# Patient Record
Sex: MALE | Race: WHITE | NOT HISPANIC OR LATINO | Employment: FULL TIME | URBAN - NONMETROPOLITAN AREA
[De-identification: names, ages, dates, MRNs, and addresses within clinical notes are randomized per-mention and may not be internally consistent; named-entity substitution may affect disease eponyms.]

---

## 2020-02-29 ENCOUNTER — OFFICE VISIT (OUTPATIENT)
Dept: FAMILY MEDICINE | Facility: OTHER | Age: 35
End: 2020-02-29
Attending: PHYSICIAN ASSISTANT
Payer: COMMERCIAL

## 2020-02-29 VITALS
BODY MASS INDEX: 29.86 KG/M2 | HEART RATE: 70 BPM | RESPIRATION RATE: 16 BRPM | OXYGEN SATURATION: 97 % | WEIGHT: 197 LBS | TEMPERATURE: 96.7 F | HEIGHT: 68 IN | DIASTOLIC BLOOD PRESSURE: 84 MMHG | SYSTOLIC BLOOD PRESSURE: 129 MMHG

## 2020-02-29 DIAGNOSIS — H66.001 NON-RECURRENT ACUTE SUPPURATIVE OTITIS MEDIA OF RIGHT EAR WITHOUT SPONTANEOUS RUPTURE OF TYMPANIC MEMBRANE: ICD-10-CM

## 2020-02-29 DIAGNOSIS — H60.391 INFECTIVE OTITIS EXTERNA, RIGHT: Primary | ICD-10-CM

## 2020-02-29 PROCEDURE — 99203 OFFICE O/P NEW LOW 30 MIN: CPT | Performed by: PHYSICIAN ASSISTANT

## 2020-02-29 RX ORDER — AMOXICILLIN 875 MG
875 TABLET ORAL 2 TIMES DAILY
Qty: 14 TABLET | Refills: 0 | Status: SHIPPED | OUTPATIENT
Start: 2020-02-29 | End: 2023-04-15

## 2020-02-29 RX ORDER — CIPROFLOXACIN AND DEXAMETHASONE 3; 1 MG/ML; MG/ML
4 SUSPENSION/ DROPS AURICULAR (OTIC) 2 TIMES DAILY
Qty: 2.8 ML | Refills: 0 | Status: SHIPPED | OUTPATIENT
Start: 2020-02-29 | End: 2020-03-07

## 2020-02-29 ASSESSMENT — MIFFLIN-ST. JEOR: SCORE: 1809.84

## 2020-02-29 ASSESSMENT — PAIN SCALES - GENERAL: PAINLEVEL: MODERATE PAIN (4)

## 2020-02-29 NOTE — PROGRESS NOTES
"SUBJECTIVE:  Lukas Uribe is a 34 year old male presents to clinic for evaluation of right ear pain  Onset 9 days ago, course got worse last night  He first noted symptoms on his flight from [facility] to [facility] 9 days ago, he was wearing ear buds on the way home, he wasn't sure if the pain was from this or the pressure from flying.   Ear pain became severe last evening. He also has mild congestion which started today.     OM history:  Infrequent infections, none that he can recall as adult  Treatments: ibuprofen daily, last dose this AM  Eating and drinking - normal      No past medical history on file.  No current outpatient medications on file.     No current facility-administered medications for this visit.       No Known Allergies    ROS  General feels well, no fever  HENT: right ear pain, mild congestion  Respiratory negative  Abdomen - negative  Skin - negative    OBJECTIVE:  Vitals:    02/29/20 1201   BP: 129/84   Pulse: 70   Resp: 16   Temp: 96.7  F (35.9  C)   TempSrc: Tympanic   SpO2: 97%   Weight: 89.4 kg (197 lb)   Height: 1.73 m (5' 8.11\")     General appearance: healthy, alert and NAD.    Eye: no injection or drainage  Ears: abnormal: R TM erythematous and bulging; canal has debris and is TTP L TM normal  Nose: mucosal erythema and mucosal edema, no sinus tenderness  Oropharynx: Mild erythema, no exudates or petechiae  Neck: normal, supple and no adenopathy  Cardiac: normal RR, no murmur  Lungs: normal respiration, clear to ausculation  Skin: no rash    ASSESSMENT:  (H60.391) Infective otitis externa, right  (primary encounter diagnosis)  Plan: ciprofloxacin-dexamethasone (CIPRODEX) 0.3-0.1         % otic suspension      (H66.001) Non-recurrent acute suppurative otitis media of right ear without spontaneous rupture of tympanic membrane  Plan: amoxicillin (AMOXIL) 875 MG tablet    Right ear pain x 9 days with worsening  External ear canal infection and middle ear infection  Start " amoxicillin 875mg oral tablet, take twice daily for 5-7  For external ear canal infection - ciprodex 4 drops twice daily x 7 days  Water precautions, do not submerge head in pool or tub until symptoms are resolved and medication is completed.   Rest and fluids  Ibuprofen or tylenol as needed for discomfort or fever  For sinus congestion - sudafed, Afrin nasal spray  Return to clinic if symptoms persist or worsen  Patient received verbal and written instruction including review of warning signs    Jeana Trammell PA-C on 2/29/2020 at 12:54 PM

## 2020-02-29 NOTE — PATIENT INSTRUCTIONS
Right ear pain  External ear canal infection and middle ear infection  Start amoxicillin 875mg oral tablet, take twice daily for 5-7  For external ear canal infection - ciprodex 4 drops twice daily x 7 days  Water precautions, do not submerge head in pool or tub until symptoms are resolved and medication is completed.   Rest and fluids  Ibuprofen or tylenol as needed for discomfort or fever  For sinus congestion - sudafed, Afrin nasal spray  Return to clinic if symptoms persist or worsen        Patient Education     Middle Ear Infection (Adult)  You have an infection of the middle ear, the space behind the eardrum. This is also called acute otitis media (AOM). Sometimes it is caused by the common cold. This is because congestion can block the internal passage (eustachian tube) that drains fluid from the middle ear. When the middle ear fills with fluid, bacteria can grow there and cause an infection. Oral antibiotics are used to treat this illness, not ear drops. Symptoms usually start to improve within 1 to 2 days of treatment.    Home care  The following are general care guidelines:    Finish all of the antibiotic medicine given, even though you may feel better after the first few days.    You may use over-the-counter medicine, such as acetaminophen or ibuprofen, to control pain and fever, unless something else was prescribed. If you have chronic liver or kidney disease or have ever had a stomach ulcer or gastrointestinal bleeding, talk with your healthcare provider before using these medicines. Do not give aspirin to anyone under 18 years of age who has a fever. It may cause severe illness or death.  Follow-up care  Follow up with your healthcare provider, or as advised, in 2 weeks if all symptoms have not gotten better, or if hearing doesn't go back to normal within 1 month.  When to seek medical advice  Call your healthcare provider right away if any of these occur:    Ear pain gets worse or does not improve  after 3 days of treatment    Unusual drowsiness or confusion    Neck pain, stiff neck, or headache    Fluid or blood draining from the ear canal    Fever of 100.4 F (38 C) or as advised     Seizure  Date Last Reviewed: 6/1/2016 2000-2019 [employer]. [address] [place], [place] [address]. All rights reserved. This information is not intended as a substitute for professional medical care. Always follow your healthcare professional's instructions.

## 2020-02-29 NOTE — NURSING NOTE
"Chief Complaint   Patient presents with     Otalgia     x 1 week       Initial /84   Pulse 70   Temp 96.7  F (35.9  C) (Tympanic)   Resp 16   Ht 1.73 m (5' 8.11\")   Wt 89.4 kg (197 lb)   SpO2 97%   BMI 29.86 kg/m   Estimated body mass index is 29.86 kg/m  as calculated from the following:    Height as of this encounter: 1.73 m (5' 8.11\").    Weight as of this encounter: 89.4 kg (197 lb).  Medication Reconciliation: complete    Nel Mason LPN  "

## 2020-09-23 ENCOUNTER — HOSPITAL ENCOUNTER (OUTPATIENT)
Dept: MRI IMAGING | Facility: OTHER | Age: 35
Discharge: HOME | End: 2020-09-23
Attending: CHIROPRACTOR | Admitting: FAMILY MEDICINE
Payer: COMMERCIAL

## 2020-09-23 ENCOUNTER — MEDICAL CORRESPONDENCE (OUTPATIENT)
Dept: HEALTH INFORMATION MANAGEMENT | Facility: OTHER | Age: 35
End: 2020-09-23

## 2020-09-23 DIAGNOSIS — M99.01 CERVICAL SEGMENT DYSFUNCTION: ICD-10-CM

## 2020-09-23 PROCEDURE — 72141 MRI NECK SPINE W/O DYE: CPT | Mod: TC

## 2023-04-15 ENCOUNTER — OFFICE VISIT (OUTPATIENT)
Dept: FAMILY MEDICINE | Facility: OTHER | Age: 38
End: 2023-04-15
Payer: COMMERCIAL

## 2023-04-15 VITALS
HEIGHT: 66 IN | TEMPERATURE: 97.4 F | BODY MASS INDEX: 33.27 KG/M2 | DIASTOLIC BLOOD PRESSURE: 86 MMHG | SYSTOLIC BLOOD PRESSURE: 136 MMHG | RESPIRATION RATE: 16 BRPM | HEART RATE: 78 BPM | WEIGHT: 207 LBS | OXYGEN SATURATION: 96 %

## 2023-04-15 DIAGNOSIS — H66.004 RECURRENT ACUTE SUPPURATIVE OTITIS MEDIA OF RIGHT EAR WITHOUT SPONTANEOUS RUPTURE OF TYMPANIC MEMBRANE: ICD-10-CM

## 2023-04-15 PROCEDURE — 99213 OFFICE O/P EST LOW 20 MIN: CPT | Performed by: NURSE PRACTITIONER

## 2023-04-15 RX ORDER — AMOXICILLIN 875 MG
875 TABLET ORAL 2 TIMES DAILY
Qty: 14 TABLET | Refills: 0 | Status: SHIPPED | OUTPATIENT
Start: 2023-04-15 | End: 2023-06-08

## 2023-04-15 RX ORDER — CIPROFLOXACIN AND DEXAMETHASONE 3; 1 MG/ML; MG/ML
4 SUSPENSION/ DROPS AURICULAR (OTIC)
COMMUNITY
Start: 2023-03-29 | End: 2023-06-08

## 2023-04-15 RX ORDER — LORATADINE 10 MG/1
10 TABLET ORAL DAILY
COMMUNITY

## 2023-04-15 ASSESSMENT — PAIN SCALES - GENERAL: PAINLEVEL: MILD PAIN (2)

## 2023-04-15 NOTE — PROGRESS NOTES
"HPI:    Lukas Uribe is a very pleasant 38 year old male who presents to [facility] today accompanied by his significant other/wife for right ear pain. This has been going on for 2.5 weeks, ongoing. He was given Ciprodex and has used as directed, in addition to Claritin with no improvement.  Reports pain 2/10 and 8/10 at its worse.  He has a history of chronic ear infections as a child and as an adult.  He shows me a picture of some yellow pus-like drainage that drained from his right ear this past week.    ROS:  Refer to HPI    /86 (BP Location: Right arm, Patient Position: Sitting, Cuff Size: Adult Regular)   Pulse 78   Temp 97.4  F (36.3  C) (Tympanic)   Resp 16   Ht 1.676 m (5' 6\")   Wt 93.9 kg (207 lb)   SpO2 96%   BMI 33.41 kg/m      EXAM:  General Appearance: Well appearing male, appropriate appearance for age. No acute distress    Ears:   Left TM intact, translucent with bony landmarks appreciated, mild erythema, no effusion, no bulging, no purulence.    Unable to visualize the right TM due to swelling, he does have some soft pale yellow wax or pus just before the TM.  Eyes: conjunctivae normal without erythema or irritation, corneas clear, no drainage or crusting, no eyelid swelling, pupils equal   Neck: supple without adenopathy  Musculoskeletal:  Equal movement of bilateral upper extremities.  Equal movement of bilateral lower extremities.  Normal gait.    Dermatological: no rashes noted of exposed skin  Psychological: normal affect, alert, oriented, and pleasant.     Diagnostics:  No results found for any visits on 04/15/23.    ASSESSMENT/PLAN:  Lukas was seen today for ear problem and fever.    Diagnoses and all orders for this visit:    Recurrent acute suppurative otitis media of right ear without spontaneous rupture of tympanic membrane  I question whether he has had a rupture of the TM on the right side due to the puslike drainage he shows me a picture of.  We are to go ahead and " treat him with amoxicillin as this is worked for him in the past.  He can go ahead and use the Ciprodex drops only if he is having pain greater than 8 out of 10.  We had a long discussion about the potential of following up with ENT should these become ongoing in the near future.  He is advised to take an over-the-counter antihistamine as he has been doing.  Drink plenty of fluids.  He is advised to take a probiotic and/or eat yogurt to prevent GI upset from the antibiotics.  He is advised if he is not feeling remarkably better by Wednesday of next week, he is to contact the clinic unit 3 and be put on my schedule for further evaluation and referral to ENT.  -     amoxicillin (AMOXIL) 875 MG tablet; Take 1 tablet (875 mg) by mouth 2 times daily    Encouraged symptomatic treatment - May use over-the-counter Tylenol or ibuprofen PRN for discomfort, swelling, or fever.  May use OTC antihistamine such as Zyrtec, Allegra, or Claritin or generic alternatives.    Encouraged fluids, salt water gargles, Chloraseptic throat spray, honey, humidifier, sinus rinse/Netti Pot, hot tea, lozenges, etc.    Discussed warning signs/symptoms indicative of need to follow-up.    Follow up with PCP or ED if symptoms persist or worsen or concerns.    I explained my diagnostic considerations and recommendations to the patient, who voiced understanding and agreement with the treatment plan. All questions were answered. We discussed potential side effects of any prescribed or recommended therapies, as well as expectations for response to treatments.    TREASURE Beltre, AGNP-C  [facility]  04/15/2023 11:19 AM

## 2023-04-15 NOTE — NURSING NOTE
Chief Complaint   Patient presents with     Ear Problem     X 2.5 WEEKS     Fever     X 2 days     Patient in clinic after 2 weeks of tx with cipro/dexameth for ear pain without relief.    Advanced Care Planning on file? no    Medication Review Completed: complete    FOOD SECURITY SCREENING QUESTIONS:    The next two questions are to help us understand your food security.  If you are feeling you need any assistance in this area, we have resources available to support you today.    Hunger Vital Signs:  Within the past 12 months we worried whether our food would run out before we got money to buy more.never  Within the past 12 months the food we bought just didn't last and we didn't have money to get more. Never    Haydee Membreno LPN

## 2023-04-19 ENCOUNTER — TELEPHONE (OUTPATIENT)
Dept: INTERNAL MEDICINE | Facility: OTHER | Age: 38
End: 2023-04-19
Payer: COMMERCIAL

## 2023-04-19 NOTE — TELEPHONE ENCOUNTER
Patient states he saw you in the [facility] on Sat and was told to call if he did not see improvement.  States that his ear still feels plugged, fluid on ear and green mucus coming out of ear.  He is wondering if you have any recommendations  Kirsty Leach LPN.......4/19/2023 4:22 PM

## 2023-04-20 NOTE — TELEPHONE ENCOUNTER
Message below read back to patient, no further questions  Kirsty Leach LPN.......4/20/2023 10:37 AM

## 2023-04-24 ENCOUNTER — OFFICE VISIT (OUTPATIENT)
Dept: FAMILY MEDICINE | Facility: OTHER | Age: 38
End: 2023-04-24
Payer: COMMERCIAL

## 2023-04-24 VITALS
WEIGHT: 216 LBS | DIASTOLIC BLOOD PRESSURE: 86 MMHG | OXYGEN SATURATION: 96 % | HEART RATE: 71 BPM | SYSTOLIC BLOOD PRESSURE: 138 MMHG | RESPIRATION RATE: 16 BRPM | BODY MASS INDEX: 34.86 KG/M2 | TEMPERATURE: 97.8 F

## 2023-04-24 DIAGNOSIS — H66.014 RECURRENT ACUTE SUPPURATIVE OTITIS MEDIA OF RIGHT EAR WITH SPONTANEOUS RUPTURE OF TYMPANIC MEMBRANE: Primary | ICD-10-CM

## 2023-04-24 PROCEDURE — 99203 OFFICE O/P NEW LOW 30 MIN: CPT

## 2023-04-24 RX ORDER — OFLOXACIN 3 MG/ML
10 SOLUTION AURICULAR (OTIC) 2 TIMES DAILY
Qty: 7 ML | Refills: 0 | Status: SHIPPED | OUTPATIENT
Start: 2023-04-24 | End: 2023-05-02

## 2023-04-24 ASSESSMENT — PAIN SCALES - GENERAL: PAINLEVEL: MILD PAIN (2)

## 2023-04-24 NOTE — PROGRESS NOTES
ASSESSMENT/PLAN:    I have reviewed the nursing notes.  I have reviewed the findings, diagnosis, plan and need for follow up with the patient.    1. Recurrent acute suppurative otitis media of right ear with spontaneous rupture of tympanic membrane  - amoxicillin-clavulanate (AUGMENTIN) 875-125 MG tablet; Take 1 tablet by mouth 2 times daily for 7 days  Dispense: 14 tablet; Refill: 0  - ofloxacin (FLOXIN) 0.3 % otic solution; Place 10 drops into the right ear 2 times daily for 14 days  Dispense: 7 mL; Refill: 0    Physical exam and symptoms consistent with recurrent acute suppurative otitis media of the right ear with spontaneous rupture of his tympanic membrane.  There is a moderate amount of purulent drainage in his right ear.  Patient has been flushing his ear over the last week due to noticing some purulent drainage.  Will treat infection with Augmentin twice a day for 7 days and ofloxacin otic solution.  Advised patient to take the Augmentin with food to reduce the risk of GI upset.  Advised patient not to flush his ears as this is can reintroduce bacteria into his ear canal.  Advised patient that when he showers or bathes that he should put cotton in the outer ear canal to prevent water from getting into his ear canal.  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  Advised patient that he can take Tylenol or ibuprofen as needed for pain.  Advised patient that he can follow-up after completing treatment to check the status of the healing of his tympanic membrane and determine if a referral to ENT is needed.    Discussed warning signs/symptoms indicative of need to f/u    Follow up if symptoms persist or worsen or concerns    I explained my diagnostic considerations and recommendations to the patient, who voiced understanding and agreement with the treatment plan. All questions were answered. We  discussed potential side effects of any prescribed or recommended therapies, as well as expectations for response to treatments.    TREASURE Mao CNP  4/24/2023  11:04 AM    HPI:    Lukas Uribe is a 38 year old male  who presents to [facility] today for concerns of ear pain    right ear pain x 3 weeks duration.     Presence of the following:   No fevers or chills.   No sore throat/pharyngitis/tonsillitis.   Yes allergy/URI Symptoms  Yes Muffled Sounds/Change in Hearing  Yes Sensation of Fullness in Ear(s)  No Ringing in Ears/Tinnitus  No Balance Changes  No Dizziness  No Headache   No Ear Drainage  No Teething  Additional Symptoms: No  Denies persistent hearing loss, foul smelling odor from ear, changes in vision, nausea, vomiting, diarrhea, chest pain, shortness of breath.     No Recent swimming/hot tub  No submerging of head in shower/bathtub.     No Recent URI or other illness  History of otitis media: Yes  History of HEENT surgery (PE tubes, tonsillectomy/adenoidectomy, etc.): No  Recent Course of ABX: Yes: amoxicillin for otitis media on 4/15/23    Treatments Tried: steroid drops, tylenol, ibuprofen  Prior History of Similar Symptoms: Yes    PCP - none    History reviewed. No pertinent past medical history.  History reviewed. No pertinent surgical history.  Social History     Tobacco Use     Smoking status: Every Day     Types: Vaping Device     Smokeless tobacco: Never   Vaping Use     Vaping status: Every Day     Substances: Nicotine, Flavoring   Substance Use Topics     Alcohol use: Yes     Current Outpatient Medications   Medication Sig Dispense Refill     ciprofloxacin-dexamethasone (CIPRODEX) 0.3-0.1 % otic suspension Place 4 drops in ear(s)       loratadine (CLARITIN) 10 MG tablet Take 10 mg by mouth daily       amoxicillin (AMOXIL) 875 MG tablet Take 1 tablet (875 mg) by mouth 2 times daily (Patient not taking: Reported on 4/24/2023) 14 tablet 0     No Known Allergies  Past medical  history, past surgical history, current medications and allergies reviewed and accurate to the best of my knowledge.      ROS:  Refer to HPI    /86   Pulse 71   Temp 97.8  F (36.6  C) (Tympanic)   Resp 16   Wt 98 kg (216 lb)   SpO2 96%   BMI 34.86 kg/m      EXAM:  General Appearance: Well appearing 38 year old male, appropriate appearance for age. No acute distress   Ears: Left TM intact, translucent with bony landmarks appreciated, no erythema, no effusion, no bulging, no purulence.  Right TM not intact, mild erythema, effusion, no bulging, purulence noted.  Left auditory canal clear.  Right auditory canal with a moderate amount of drainage.  Normal external ears, non tender.  Eyes: conjunctivae normal without erythema or irritation, corneas clear, no drainage or crusting, no eyelid swelling, pupils equal   Respiratory: normal chest wall and respirations.  Normal effort. No increased work of breathing.  No cough appreciated.  Cardiac: RRR   Neuro: Alert and oriented to person, place, and time.    Psychological: normal affect, alert, oriented, and pleasant.

## 2023-04-24 NOTE — NURSING NOTE
"Chief Complaint   Patient presents with     Ear Problem     Patient presents today with right ear pain. He was seen last week and was given amoxicillin, in which he has completed. He states that it did not get better. He also has been using some drops.   Initial /86   Pulse 71   Temp 97.8  F (36.6  C) (Tympanic)   Resp 16   Wt 98 kg (216 lb)   SpO2 96%   BMI 34.86 kg/m   Estimated body mass index is 34.86 kg/m  as calculated from the following:    Height as of 4/15/23: 1.676 m (5' 6\").    Weight as of this encounter: 98 kg (216 lb).  Medication Reconciliation: complete    Laurence Lafleur LPN     Advanced Care Directive reviewed.     "

## 2023-05-02 ENCOUNTER — OFFICE VISIT (OUTPATIENT)
Dept: FAMILY MEDICINE | Facility: OTHER | Age: 38
End: 2023-05-02
Attending: NURSE PRACTITIONER
Payer: COMMERCIAL

## 2023-05-02 VITALS
SYSTOLIC BLOOD PRESSURE: 130 MMHG | OXYGEN SATURATION: 97 % | WEIGHT: 207.7 LBS | DIASTOLIC BLOOD PRESSURE: 90 MMHG | HEIGHT: 67 IN | TEMPERATURE: 97.3 F | HEART RATE: 82 BPM | BODY MASS INDEX: 32.6 KG/M2 | RESPIRATION RATE: 20 BRPM

## 2023-05-02 DIAGNOSIS — H60.311 ACUTE DIFFUSE OTITIS EXTERNA OF RIGHT EAR: Primary | ICD-10-CM

## 2023-05-02 PROCEDURE — 99213 OFFICE O/P EST LOW 20 MIN: CPT

## 2023-05-02 RX ORDER — OFLOXACIN 3 MG/ML
10 SOLUTION AURICULAR (OTIC) 2 TIMES DAILY
Qty: 7 ML | Refills: 0 | Status: SHIPPED | OUTPATIENT
Start: 2023-05-02 | End: 2023-06-08

## 2023-05-02 ASSESSMENT — PAIN SCALES - GENERAL: PAINLEVEL: MILD PAIN (2)

## 2023-05-02 NOTE — PROGRESS NOTES
ASSESSMENT/PLAN:    I have reviewed the nursing notes.  I have reviewed the findings, diagnosis, plan and need for follow up with the patient.    1. Acute diffuse otitis externa of right ear  - ofloxacin (FLOXIN) 0.3 % otic solution; Place 10 drops into the right ear 2 times daily  Dispense: 7 mL; Refill: 0  - Adult ENT  Referral; Future    Physical exam and symptoms consistent with recurrent otitis externa of his right ear.  Unable to visualize tympanic membrane due to copious amounts of purulent drainage in patient's right ear canal.  Refilled patient's ofloxacin otic solution and advised him to continue with this treatment.  Placed referral to ENT for further evaluation.  Advised patient to take Tylenol and ibuprofen as needed for pain.  Continue use of placing cotton in outer ear canal while working and bathing.    Discussed warning signs/symptoms indicative of need to f/u    Follow up if symptoms persist or worsen or concerns    I explained my diagnostic considerations and recommendations to the patient, who voiced understanding and agreement with the treatment plan. All questions were answered. We discussed potential side effects of any prescribed or recommended therapies, as well as expectations for response to treatments.    Jose Alfredo Browning, APRN CNP  5/2/2023  9:44 AM    HPI:    Lukas Uribe is a 38 year old male  who presents to [facility] today for concerns of ear pain    right ear pain x 1 month duration.     Presence of the following:   Yes fevers or chills. Fever, highest reported temperature: low grade  No sore throat/pharyngitis/tonsillitis.   No allergy/URI Symptoms  Yes Muffled Sounds/Change in Hearing  Yes Sensation of Fullness in Ear(s)  No Ringing in Ears/Tinnitus  No Balance Changes  No Dizziness  No Headache   Yes Ear Drainage  No Teething  Additional Symptoms: No  Denies persistent hearing loss, foul smelling odor from ear, changes in vision, nausea, vomiting, diarrhea, chest  "pain, shortness of breath.     No Recent swimming/hot tub  No submerging of head in shower/bathtub.     No Recent URI or other illness  History of otitis media: Yes:  4/24/23 treated for otitis media with spontaneous rupture of tympanic membrane with Augmentin and ofloxacin otic solution.   4/15/23 treated for otitis media with spontaneous rupture of tympanic membrane with amoxicillin.   3/29/23 treated for right otitis externa with Ciprodex otic suspension  History of HEENT surgery (PE tubes, tonsillectomy/adenoidectomy, etc.): No  Recent Course of ABX: Yes: see above    Treatments Tried: tylenol  Prior History of Similar Symptoms: Yes    PCP - none    History reviewed. No pertinent past medical history.  History reviewed. No pertinent surgical history.  Social History     Tobacco Use     Smoking status: Every Day     Types: Vaping Device     Smokeless tobacco: Never   Vaping Use     Vaping status: Every Day     Substances: Nicotine, Flavoring   Substance Use Topics     Alcohol use: Yes     Current Outpatient Medications   Medication Sig Dispense Refill     loratadine (CLARITIN) 10 MG tablet Take 10 mg by mouth daily       ofloxacin (FLOXIN) 0.3 % otic solution Place 10 drops into the right ear 2 times daily for 14 days 7 mL 0     amoxicillin (AMOXIL) 875 MG tablet Take 1 tablet (875 mg) by mouth 2 times daily (Patient not taking: Reported on 4/24/2023) 14 tablet 0     ciprofloxacin-dexamethasone (CIPRODEX) 0.3-0.1 % otic suspension Place 4 drops in ear(s) (Patient not taking: Reported on 5/2/2023)       No Known Allergies  Past medical history, past surgical history, current medications and allergies reviewed and accurate to the best of my knowledge.      ROS:  Refer to HPI    BP (!) 130/90 (BP Location: Right arm, Patient Position: Sitting, Cuff Size: Adult Large)   Pulse 82   Temp 97.3  F (36.3  C) (Tympanic)   Resp 20   Ht 1.702 m (5' 7\")   Wt 94.2 kg (207 lb 11.2 oz)   SpO2 97%   BMI 32.53 kg/m  "     EXAM:  General Appearance: Well appearing 38 year old male, appropriate appearance for age. No acute distress   Ears: Left TM intact, translucent with bony landmarks appreciated, no erythema, no effusion, no bulging, no purulence.  Right TM unable to visualize due to purulent drainage.  Left auditory canal clear.  Right auditory canal with purulent drainage.  Normal external ears, non tender.  Eyes: conjunctivae normal without erythema or irritation, corneas clear, no drainage or crusting, no eyelid swelling, pupils equal   Oropharynx: moist mucous membranes, posterior pharynx without erythema, tonsils symmetric and 1+, no erythema, no exudates or petechiae, no post nasal drip seen, no trismus, voice clear.    Nose:  Bilateral nares: no erythema, no edema, no drainage or congestion   Neck: supple without adenopathy  Respiratory: normal chest wall and respirations.  Normal effort.  Clear to auscultation bilaterally, no wheezing, crackles or rhonchi.  No increased work of breathing.  No cough appreciated.  Cardiac: RRR with no murmurs  Neuro: Alert and oriented to person, place, and time.    Psychological: normal affect, alert, oriented, and pleasant.

## 2023-05-02 NOTE — NURSING NOTE
"Pt presents to  for R ear pain x4 weeks. Pt states he has been in 3 times for this problem and has been given antibiotics each time, and he does feel some relief from them, but ear is still not healing.    Chief Complaint   Patient presents with     Otalgia     R ear pain x4 weeks       FOOD SECURITY SCREENING QUESTIONS  Hunger Vital Signs:  Within the past 12 months we worried whether our food would run out before we got money to buy more. Never  Within the past 12 months the food we bought just didn't last and we didn't have money to get more. Never  Radha Membreno 5/2/2023 9:42 AM      Initial BP (!) 130/90 (BP Location: Right arm, Patient Position: Sitting, Cuff Size: Adult Large)   Pulse 82   Temp 97.3  F (36.3  C) (Tympanic)   Resp 20   Ht 1.702 m (5' 7\")   Wt 94.2 kg (207 lb 11.2 oz)   SpO2 97%   BMI 32.53 kg/m   Estimated body mass index is 32.53 kg/m  as calculated from the following:    Height as of this encounter: 1.702 m (5' 7\").    Weight as of this encounter: 94.2 kg (207 lb 11.2 oz).  Medication Reconciliation: complete    Radha Membreno  "

## 2023-05-06 ENCOUNTER — HEALTH MAINTENANCE LETTER (OUTPATIENT)
Age: 38
End: 2023-05-06

## 2023-05-17 PROBLEM — M50.222 HERNIATION OF INTERVERTEBRAL DISC AT C5-C6 LEVEL: Status: ACTIVE | Noted: 2021-01-05

## 2023-05-26 ENCOUNTER — OFFICE VISIT (OUTPATIENT)
Dept: AUDIOLOGY | Facility: OTHER | Age: 38
End: 2023-05-26
Attending: AUDIOLOGIST
Payer: COMMERCIAL

## 2023-05-26 ENCOUNTER — OFFICE VISIT (OUTPATIENT)
Dept: OTOLARYNGOLOGY | Facility: OTHER | Age: 38
End: 2023-05-26
Attending: AUDIOLOGIST
Payer: COMMERCIAL

## 2023-05-26 VITALS
WEIGHT: 200 LBS | TEMPERATURE: 97.4 F | HEIGHT: 67 IN | HEART RATE: 66 BPM | BODY MASS INDEX: 31.39 KG/M2 | OXYGEN SATURATION: 96 % | SYSTOLIC BLOOD PRESSURE: 110 MMHG | DIASTOLIC BLOOD PRESSURE: 80 MMHG

## 2023-05-26 DIAGNOSIS — H60.311 ACUTE DIFFUSE OTITIS EXTERNA OF RIGHT EAR: ICD-10-CM

## 2023-05-26 DIAGNOSIS — Z01.10 EXAMINATION OF EARS AND HEARING: Primary | ICD-10-CM

## 2023-05-26 DIAGNOSIS — B36.9 ACUTE FUNGAL OTITIS EXTERNA: Primary | ICD-10-CM

## 2023-05-26 DIAGNOSIS — H62.40 ACUTE FUNGAL OTITIS EXTERNA: Primary | ICD-10-CM

## 2023-05-26 PROCEDURE — 87107 FUNGI IDENTIFICATION MOLD: CPT | Performed by: PHYSICIAN ASSISTANT

## 2023-05-26 PROCEDURE — 87205 SMEAR GRAM STAIN: CPT | Performed by: PHYSICIAN ASSISTANT

## 2023-05-26 PROCEDURE — 87102 FUNGUS ISOLATION CULTURE: CPT | Performed by: PHYSICIAN ASSISTANT

## 2023-05-26 PROCEDURE — 92504 EAR MICROSCOPY EXAMINATION: CPT | Performed by: PHYSICIAN ASSISTANT

## 2023-05-26 PROCEDURE — 99214 OFFICE O/P EST MOD 30 MIN: CPT | Mod: 25 | Performed by: PHYSICIAN ASSISTANT

## 2023-05-26 PROCEDURE — 87070 CULTURE OTHR SPECIMN AEROBIC: CPT | Performed by: PHYSICIAN ASSISTANT

## 2023-05-26 RX ORDER — CLOTRIMAZOLE 1 G/ML
SOLUTION TOPICAL
Qty: 30 ML | Refills: 1 | Status: SHIPPED | OUTPATIENT
Start: 2023-05-26 | End: 2023-06-08

## 2023-05-26 ASSESSMENT — PAIN SCALES - GENERAL: PAINLEVEL: NO PAIN (0)

## 2023-05-26 NOTE — PATIENT INSTRUCTIONS
Start Clotrimazole solution 5 drops three times daily for 14 days to right ear.   Right ear- fungal infection  Water precautions  Return within 2 weeks for repeat exam.     Ear culture- pending and will call w/ results     Thank you for allowing Beba Dacosta PA-C and our ENT team to participate in your care.  If your medications are too expensive, please give the nurse a call.  We can possibly change this medication.  If you have a scheduling or an appointment question please contact our Health Unit Coordinator at [phone], Ext. [phone].    ALL nursing questions or concerns can be directed to your ENT nurse at: [phone] [place]

## 2023-05-26 NOTE — PROGRESS NOTES
Background; Patient scheduled first in Audiology and ENT next.   Results: Plugged right ear and report of ear infection.  Otoscopy shows clear left canal and blocked right canal.    Recommendations: Patient to see ENT first hearing evaluation pending.    Jessica Cardona M.S., [facility]3]-A  Audiologist #[id number]

## 2023-05-26 NOTE — PROGRESS NOTES
Otolaryngology Consultation    Patient: Lukas Uribe  : 1985    Patient presents with:  Consult: Referred by Jose Alfredo Browning for acute diffuse OE right      HPI:  Lukas Uribe is a 38 year old male seen today for acute right otitis externa.  Patient was seen on 3/29/2023, 4/15/2023, 2023, 2023 for recurrent acute otitis externa.  He has been treated with multiple rounds of oral antibiotics and eardrops.    Today, Lukas has been doing fairly well since his few office visits.   He has felt his hearing is very muffled on the right side.   No recent drainage.   He reports some debris in canal.   Hx of TM perforation but did heal.   Denies COM or otologic surgeries.   Denies fluctuating hearing loss or tinnitus.   Denies vertigo or facial paraesthesia.     History of otitis media: Yes:  23 treated for otitis media with spontaneous rupture of tympanic membrane with Augmentin and ofloxacin otic solution.   4/15/23 treated for otitis media with spontaneous rupture of tympanic membrane with amoxicillin.   3/29/23 treated for right otitis externa with Ciprodex otic suspension      Current Outpatient Rx   Medication Sig Dispense Refill     amoxicillin (AMOXIL) 875 MG tablet Take 1 tablet (875 mg) by mouth 2 times daily (Patient not taking: Reported on 2023) 14 tablet 0     ciprofloxacin-dexamethasone (CIPRODEX) 0.3-0.1 % otic suspension Place 4 drops in ear(s) (Patient not taking: Reported on 2023)       loratadine (CLARITIN) 10 MG tablet Take 10 mg by mouth daily       ofloxacin (FLOXIN) 0.3 % otic solution Place 10 drops into the right ear 2 times daily 7 mL 0       Allergies: Patient has no known allergies.     No past medical history on file.    No past surgical history on file.    ENT family history reviewed    Social History     Tobacco Use     Smoking status: Every Day     Types: Vaping Device     Smokeless tobacco: Never   Vaping Use     Vaping status: Every Day     Substances:  "Nicotine, Flavoring   Substance Use Topics     Alcohol use: Yes     Drug use: Not Currently       Review of Systems  ROS: 10 point ROS neg other than the symptoms noted above in the HPI    Physical Exam  /80 (BP Location: Right arm, Patient Position: Sitting, Cuff Size: Adult Regular)   Pulse 66   Temp 97.4  F (36.3  C) (Tympanic)   Ht 1.702 m (5' 7\")   Wt 90.7 kg (200 lb)   SpO2 96%   BMI 31.32 kg/m    General - The patient is well nourished and well developed, and appears to have good nutritional status.  Alert and oriented to person and place, answers questions and cooperates with examination appropriately.   Head and Face - Normocephalic and atraumatic, with no gross asymmetry noted.  The facial nerve is intact, with strong symmetric movements.  Voice and Breathing - The patient was breathing comfortably without the use of accessory muscles. There was no wheezing, stridor, or stertor.  The patients voice was clear and strong, and had appropriate pitch and quality.  Ears - ears examined with autoscope and under otologic microscopy.  Left EAC is clear.  Left tympanic membrane is intact without effusion or retraction.  Right EAC filled with spray angio and fungal debris.  Ear culture was completed.  Canal was cleaned with #7 Wheatley, #5 Wheatley, #3 Wheatley.  There is mild excoriation throughout canal.  There is no granulation tissue or polypoid tissue present.  Tympanic membrane appears intact there was a small area of monomeric, within along the superior anterior quadrant.  Remainder of the tympanic membrane appears intact.  There is no effusion noted.  Eyes - Extraocular movements intact, and the pupils were reactive to light.  Sclera were not icteric or injected, conjunctiva were pink and moist.  Mouth - Examination of the oral cavity showed pink, healthy oral mucosa. No lesions or ulcerations noted.  The tongue was mobile and midline, and the dentition were in good condition.    Throat - The walls " of the oropharynx were smooth, pink, moist, symmetric, and had no lesions or ulcerations.  The tonsillar pillars and soft palate were symmetric.  The uvula was midline on elevation.    Neck - Normal midline excursion of the laryngotracheal complex during swallowing.  Full range of motion on passive movement.  Palpation of the occipital, submental, submandibular, internal jugular chain, and supraclavicular nodes did not demonstrate any abnormal lymph nodes or masses.  Palpation of the thyroid was soft and smooth, with no nodules or goiter appreciated.  The trachea was mobile and midline.  Nose - External contour is symmetric, no gross deflection or scars.  Nasal mucosa is pink and moist with no abnormal mucus.        Impression and Plan- Lukas Uribe is a 38 year old male with:    ICD-10-CM    1. Acute fungal otitis externa  B36.9 clotrimazole (LOTRIMIN) 1 % external solution    H62.40 Respiratory Aerobic Bacterial Culture with Gram Stain     Gram stain     Fungus Culture, non-blood      2. Acute diffuse otitis externa of right ear  H60.311 Adult ENT  Referral     Respiratory Aerobic Bacterial Culture with Gram Stain     Gram stain     Fungus Culture, non-blood          Patient noted significant improvement following debridement.  He was started on clotrimazole solution  Start clotrimazole 5 drops 3 times daily for 10 to 14 days.  Return to ENT within 10 to 14 days.  Water precautions.  We postpone audiogram as he felt noted improvement following cleaning.        Beba Dacosta PA-C  ENT  [facility]

## 2023-05-26 NOTE — LETTER
2023         RE: Lukas Uribe  [address]  [place] [address]        Dear Colleague,    Thank you for referring your patient, Lukas Uribe, to the [facility]. Please see a copy of my visit note below.    Otolaryngology Consultation    Patient: Lukas Uribe  : 1985    Patient presents with:  Consult: Referred by Jose Alfredo Browning for acute diffuse OE right      HPI:  Lukas Uribe is a 38 year old male seen today for acute right otitis externa.  Patient was seen on 3/29/2023, 4/15/2023, 2023, 2023 for recurrent acute otitis externa.  He has been treated with multiple rounds of oral antibiotics and eardrops.    Today, Lukas has been doing fairly well since his few office visits.   He has felt his hearing is very muffled on the right side.   No recent drainage.   He reports some debris in canal.   Hx of TM perforation but did heal.   Denies COM or otologic surgeries.   Denies fluctuating hearing loss or tinnitus.   Denies vertigo or facial paraesthesia.     History of otitis media: Yes:  23 treated for otitis media with spontaneous rupture of tympanic membrane with Augmentin and ofloxacin otic solution.   4/15/23 treated for otitis media with spontaneous rupture of tympanic membrane with amoxicillin.   3/29/23 treated for right otitis externa with Ciprodex otic suspension      Current Outpatient Rx   Medication Sig Dispense Refill     amoxicillin (AMOXIL) 875 MG tablet Take 1 tablet (875 mg) by mouth 2 times daily (Patient not taking: Reported on 2023) 14 tablet 0     ciprofloxacin-dexamethasone (CIPRODEX) 0.3-0.1 % otic suspension Place 4 drops in ear(s) (Patient not taking: Reported on 2023)       loratadine (CLARITIN) 10 MG tablet Take 10 mg by mouth daily       ofloxacin (FLOXIN) 0.3 % otic solution Place 10 drops into the right ear 2 times daily 7 mL 0       Allergies: Patient has no known allergies.     No past medical history on  "file.    No past surgical history on file.    ENT family history reviewed    Social History     Tobacco Use     Smoking status: Every Day     Types: Vaping Device     Smokeless tobacco: Never   Vaping Use     Vaping status: Every Day     Substances: Nicotine, Flavoring   Substance Use Topics     Alcohol use: Yes     Drug use: Not Currently       Review of Systems  ROS: 10 point ROS neg other than the symptoms noted above in the HPI    Physical Exam  /80 (BP Location: Right arm, Patient Position: Sitting, Cuff Size: Adult Regular)   Pulse 66   Temp 97.4  F (36.3  C) (Tympanic)   Ht 1.702 m (5' 7\")   Wt 90.7 kg (200 lb)   SpO2 96%   BMI 31.32 kg/m    General - The patient is well nourished and well developed, and appears to have good nutritional status.  Alert and oriented to person and place, answers questions and cooperates with examination appropriately.   Head and Face - Normocephalic and atraumatic, with no gross asymmetry noted.  The facial nerve is intact, with strong symmetric movements.  Voice and Breathing - The patient was breathing comfortably without the use of accessory muscles. There was no wheezing, stridor, or stertor.  The patients voice was clear and strong, and had appropriate pitch and quality.  Ears - ears examined with autoscope and under otologic microscopy.  Left EAC is clear.  Left tympanic membrane is intact without effusion or retraction.  Right EAC filled with spray angio and fungal debris.  Ear culture was completed.  Canal was cleaned with #7 Wheatley, #5 Wheatley, #3 Wheatley.  There is mild excoriation throughout canal.  There is no granulation tissue or polypoid tissue present.  Tympanic membrane appears intact there was a small area of monomeric, within along the superior anterior quadrant.  Remainder of the tympanic membrane appears intact.  There is no effusion noted.  Eyes - Extraocular movements intact, and the pupils were reactive to light.  Sclera were not icteric or " injected, conjunctiva were pink and moist.  Mouth - Examination of the oral cavity showed pink, healthy oral mucosa. No lesions or ulcerations noted.  The tongue was mobile and midline, and the dentition were in good condition.    Throat - The walls of the oropharynx were smooth, pink, moist, symmetric, and had no lesions or ulcerations.  The tonsillar pillars and soft palate were symmetric.  The uvula was midline on elevation.    Neck - Normal midline excursion of the laryngotracheal complex during swallowing.  Full range of motion on passive movement.  Palpation of the occipital, submental, submandibular, internal jugular chain, and supraclavicular nodes did not demonstrate any abnormal lymph nodes or masses.  Palpation of the thyroid was soft and smooth, with no nodules or goiter appreciated.  The trachea was mobile and midline.  Nose - External contour is symmetric, no gross deflection or scars.  Nasal mucosa is pink and moist with no abnormal mucus.        Impression and Plan- Lukas Uribe is a 38 year old male with:    ICD-10-CM    1. Acute fungal otitis externa  B36.9 clotrimazole (LOTRIMIN) 1 % external solution    H62.40 Respiratory Aerobic Bacterial Culture with Gram Stain     Gram stain     Fungus Culture, non-blood      2. Acute diffuse otitis externa of right ear  H60.311 Adult ENT  Referral     Respiratory Aerobic Bacterial Culture with Gram Stain     Gram stain     Fungus Culture, non-blood          Patient noted significant improvement following debridement.  He was started on clotrimazole solution  Start clotrimazole 5 drops 3 times daily for 10 to 14 days.  Return to ENT within 10 to 14 days.  Water precautions.  We postpone audiogram as he felt noted improvement following cleaning.        Beba Dacosta PA-C  ENT  [facility], [place]          Again, thank you for allowing me to participate in the care of your patient.        Sincerely,        Beba Dacosta PA-C

## 2023-05-28 LAB
BACTERIA SPEC CULT: ABNORMAL
GRAM STAIN RESULT: ABNORMAL

## 2023-06-08 ENCOUNTER — OFFICE VISIT (OUTPATIENT)
Dept: OTOLARYNGOLOGY | Facility: OTHER | Age: 38
End: 2023-06-08
Attending: PHYSICIAN ASSISTANT
Payer: COMMERCIAL

## 2023-06-08 VITALS
DIASTOLIC BLOOD PRESSURE: 80 MMHG | OXYGEN SATURATION: 97 % | WEIGHT: 210 LBS | HEART RATE: 76 BPM | HEIGHT: 67 IN | BODY MASS INDEX: 32.96 KG/M2 | SYSTOLIC BLOOD PRESSURE: 130 MMHG | TEMPERATURE: 98.2 F

## 2023-06-08 DIAGNOSIS — B36.9 ACUTE FUNGAL OTITIS EXTERNA: ICD-10-CM

## 2023-06-08 DIAGNOSIS — H62.40 ACUTE FUNGAL OTITIS EXTERNA: ICD-10-CM

## 2023-06-08 PROCEDURE — 99213 OFFICE O/P EST LOW 20 MIN: CPT | Mod: 25 | Performed by: PHYSICIAN ASSISTANT

## 2023-06-08 PROCEDURE — 92504 EAR MICROSCOPY EXAMINATION: CPT | Performed by: PHYSICIAN ASSISTANT

## 2023-06-08 RX ORDER — CLOTRIMAZOLE 1 G/ML
SOLUTION TOPICAL
Qty: 30 ML | Refills: 1 | Status: SHIPPED | OUTPATIENT
Start: 2023-06-08

## 2023-06-08 ASSESSMENT — PAIN SCALES - GENERAL: PAINLEVEL: NO PAIN (0)

## 2023-06-08 NOTE — LETTER
"    6/8/2023         RE: Lukas Uribe  [address]  [place] [address]        Dear Colleague,    Thank you for referring your patient, Lukas Uribe, to the [facility]. Please see a copy of my visit note below.    Chief Complaint   Patient presents with     Ear Problem     Follow up acute fungal OE and acute diffuse OE right       Patient returns to ENT for repeat ear debridement. He was last seen on 5/26/23   Today, Lukas has been doing well since his last visit.   Hearing has improved to his normal range.   No recent drainage.   He reports some debris in canal.   Hx of TM perforation but did heal.   Denies COM or otologic surgeries.   Denies fluctuating hearing loss or tinnitus.   Denies vertigo or facial paraesthesia.      History of otitis media: Yes:  4/24/23 treated for otitis media with spontaneous rupture of tympanic membrane with Augmentin and ofloxacin otic solution.   4/15/23 treated for otitis media with spontaneous rupture of tympanic membrane with amoxicillin.   3/29/23 treated for right otitis externa with Ciprodex otic suspension    No past medical history on file.   No Known Allergies  Current Outpatient Medications   Medication     amoxicillin (AMOXIL) 875 MG tablet     ciprofloxacin-dexamethasone (CIPRODEX) 0.3-0.1 % otic suspension     clotrimazole (LOTRIMIN) 1 % external solution     loratadine (CLARITIN) 10 MG tablet     ofloxacin (FLOXIN) 0.3 % otic solution     No current facility-administered medications for this visit.     ROS- SEE HPI  /80 (BP Location: Right arm, Cuff Size: Adult Large)   Pulse 76   Temp 98.2  F (36.8  C) (Tympanic)   Ht 1.702 m (5' 7\")   Wt 95.3 kg (210 lb)   SpO2 97%   BMI 32.89 kg/m      General - The patient is well nourished and well developed, and appears to have good nutritional status.  Alert and oriented to person and place, answers questions and cooperates with examination appropriately.   Head and Face - " Normocephalic and atraumatic, with no gross asymmetry noted.  The facial nerve is intact, with strong symmetric movements.  Voice and Breathing - The patient was breathing comfortably without the use of accessory muscles. There was no wheezing, stridor, or stertor.  The patients voice was clear and strong, and had appropriate pitch and quality.  Ears - ears examined with autoscope and under otologic microscopy.  Left EAC is clear.  Left tympanic membrane is intact without effusion or retraction.    Right EAC has improved greatly since his last visit.  There is dried crusting and debris present scants branches noted along TM.  Canal was cleaned with #5 Wheatley #3 Wheatley.  Small dried crusting along TM was removed with cup forceps and #3 Wheatley.  Tympanic membrane appears intact without effusion or retraction.  Clotrimazole solution was placed.      ASSESSMENT/ PLAN     ICD-10-CM    1. Acute fungal otitis externa  B36.9 clotrimazole (LOTRIMIN) 1 % external solution    H62.40           Reviewed with Lukas that his ear has greatly improved since his initial visit.  I highly recommended him to continue with clotrimazole solution for the next 7 to 10 days.  Return to ENT for repeat exam in 7 to 10 days at that time we will plan to apply miconazole powder.    Continue with water precautions.  Return to ENT sooner if there is any worsening symptoms.        Beba Dacosta PA-C  ENT  [facility], [place]             Again, thank you for allowing me to participate in the care of your patient.        Sincerely,        Beba Dacosta PA-C

## 2023-06-08 NOTE — PATIENT INSTRUCTIONS
Ear is improving.   Continue with drops as directed for the next 7-10 days.   Follow up in 7-10 days.       Thank you for allowing Beba Dacosta PA-C and our ENT team to participate in your care.  If your medications are too expensive, please give the nurse a call.  We can possibly change this medication.  If you have a scheduling or an appointment question please contact our Health Unit Coordinator at [phone], Ext. [phone].    ALL nursing questions or concerns can be directed to your ENT nurse at: [phone] [place]

## 2023-06-08 NOTE — PROGRESS NOTES
"Chief Complaint   Patient presents with     Ear Problem     Follow up acute fungal OE and acute diffuse OE right       Patient returns to ENT for repeat ear debridement. He was last seen on 5/26/23   Today, Lukas has been doing well since his last visit.   Hearing has improved to his normal range.   No recent drainage.   He reports some debris in canal.   Hx of TM perforation but did heal.   Denies COM or otologic surgeries.   Denies fluctuating hearing loss or tinnitus.   Denies vertigo or facial paraesthesia.      History of otitis media: Yes:  4/24/23 treated for otitis media with spontaneous rupture of tympanic membrane with Augmentin and ofloxacin otic solution.   4/15/23 treated for otitis media with spontaneous rupture of tympanic membrane with amoxicillin.   3/29/23 treated for right otitis externa with Ciprodex otic suspension    No past medical history on file.   No Known Allergies  Current Outpatient Medications   Medication     amoxicillin (AMOXIL) 875 MG tablet     ciprofloxacin-dexamethasone (CIPRODEX) 0.3-0.1 % otic suspension     clotrimazole (LOTRIMIN) 1 % external solution     loratadine (CLARITIN) 10 MG tablet     ofloxacin (FLOXIN) 0.3 % otic solution     No current facility-administered medications for this visit.     ROS- SEE HPI  /80 (BP Location: Right arm, Cuff Size: Adult Large)   Pulse 76   Temp 98.2  F (36.8  C) (Tympanic)   Ht 1.702 m (5' 7\")   Wt 95.3 kg (210 lb)   SpO2 97%   BMI 32.89 kg/m      General - The patient is well nourished and well developed, and appears to have good nutritional status.  Alert and oriented to person and place, answers questions and cooperates with examination appropriately.   Head and Face - Normocephalic and atraumatic, with no gross asymmetry noted.  The facial nerve is intact, with strong symmetric movements.  Voice and Breathing - The patient was breathing comfortably without the use of accessory muscles. There was no wheezing, stridor, or " stertor.  The patients voice was clear and strong, and had appropriate pitch and quality.  Ears - ears examined with autoscope and under otologic microscopy.  Left EAC is clear.  Left tympanic membrane is intact without effusion or retraction.    Right EAC has improved greatly since his last visit.  There is dried crusting and debris present scants branches noted along TM.  Canal was cleaned with #5 Wheatley #3 Wheatley.  Small dried crusting along TM was removed with cup forceps and #3 Wheatley.  Tympanic membrane appears intact without effusion or retraction.  Clotrimazole solution was placed.      ASSESSMENT/ PLAN     ICD-10-CM    1. Acute fungal otitis externa  B36.9 clotrimazole (LOTRIMIN) 1 % external solution    H62.40           Reviewed with Lukas that his ear has greatly improved since his initial visit.  I highly recommended him to continue with clotrimazole solution for the next 7 to 10 days.  Return to ENT for repeat exam in 7 to 10 days at that time we will plan to apply miconazole powder.    Continue with water precautions.  Return to ENT sooner if there is any worsening symptoms.        Beba Dacosta PA-C  ENT  [facility], [place]

## 2023-06-20 ENCOUNTER — OFFICE VISIT (OUTPATIENT)
Dept: OTOLARYNGOLOGY | Facility: OTHER | Age: 38
End: 2023-06-20
Attending: PHYSICIAN ASSISTANT
Payer: COMMERCIAL

## 2023-06-20 VITALS
OXYGEN SATURATION: 97 % | SYSTOLIC BLOOD PRESSURE: 110 MMHG | DIASTOLIC BLOOD PRESSURE: 60 MMHG | TEMPERATURE: 97 F | HEIGHT: 67 IN | BODY MASS INDEX: 31.23 KG/M2 | WEIGHT: 199 LBS | HEART RATE: 72 BPM

## 2023-06-20 DIAGNOSIS — B36.9 ACUTE FUNGAL OTITIS EXTERNA: Primary | ICD-10-CM

## 2023-06-20 DIAGNOSIS — H62.40 ACUTE FUNGAL OTITIS EXTERNA: Primary | ICD-10-CM

## 2023-06-20 PROCEDURE — 99213 OFFICE O/P EST LOW 20 MIN: CPT | Mod: 25 | Performed by: PHYSICIAN ASSISTANT

## 2023-06-20 PROCEDURE — 92504 EAR MICROSCOPY EXAMINATION: CPT | Performed by: PHYSICIAN ASSISTANT

## 2023-06-20 ASSESSMENT — PAIN SCALES - GENERAL: PAINLEVEL: NO PAIN (0)

## 2023-06-20 NOTE — LETTER
"    6/20/2023         RE: Lukas Uribe  [address]  [place] [address]        Dear Colleague,    Thank you for referring your patient, Lukas Uribe, to the [facility]. Please see a copy of my visit note below.    Chief Complaint   Patient presents with     Ear Problem     Follow up acute fungal OE       Patient returns to ENT for repeat ear debridement. He was last seen on 6/8/2023 and was overall doing well.  His ear had significant improvement upon exam.   recommended to continue with clotrimazole and return to ENT today for possible application miconazole powder.      Today, Lukas has been doing well since his last visit.   Hearing has improved to his normal range.   No recent drainage.   He reports some debris in canal.   Hx of TM perforation but did heal.   Denies COM or otologic surgeries.   Denies fluctuating hearing loss or tinnitus.   Denies vertigo or facial paraesthesia.      History of otitis media:   4/24/23 treated for otitis media with spontaneous rupture of tympanic membrane with Augmentin and ofloxacin otic solution.   4/15/23 treated for otitis media with spontaneous rupture of tympanic membrane with amoxicillin.   3/29/23 treated for right otitis externa with Ciprodex otic suspension       No past medical history on file.   No Known Allergies  Current Outpatient Medications   Medication     clotrimazole (LOTRIMIN) 1 % external solution     loratadine (CLARITIN) 10 MG tablet     No current facility-administered medications for this visit.     ROS- SEE HPI  /60 (BP Location: Right arm, Cuff Size: Adult Large)   Pulse 72   Temp 97  F (36.1  C) (Tympanic)   Ht 1.702 m (5' 7\")   Wt 90.3 kg (199 lb)   SpO2 97%   BMI 31.17 kg/m      General - The patient is well nourished and well developed, and appears to have good nutritional status.  Alert and oriented to person and place, answers questions and cooperates with examination appropriately.   Head and " Face - Normocephalic and atraumatic, with no gross asymmetry noted.  The facial nerve is intact, with strong symmetric movements.  Voice and Breathing - The patient was breathing comfortably without the use of accessory muscles. There was no wheezing, stridor, or stertor.  The patients voice was clear and strong, and had appropriate pitch and quality.  Ears - ears examined with autoscope and under otologic microscopy.  Left EAC is clear.  Left tympanic membrane is intact without effusion or retraction.    Right EAC has improved greatly since his last visit.  There scattered dried debris which was removed with #5, #3 Wheatley.  Canal appears without spur angio.  Tympanic membrane appears intact without effusion or retraction.  Miconazole applied throughout right EAC       ASSESSMENT/ PLAN     ICD-10-CM    1. Acute fungal otitis externa  B36.9     H62.40           Right ear appears greatly improved since last visit.  No spring injury was visualized upon exam.  Miconazole powder was applied throughout.    Hold clotrimazole solution.  Return in 4 to 6 weeks for repeat exam.  Continue with water precautions.  Return to ENT sooner if there is any worsening symptoms.          Beba Dacosta PA-C  ENT  [facility], [place]      Again, thank you for allowing me to participate in the care of your patient.        Sincerely,        Beba Dacosta PA-C

## 2023-06-20 NOTE — PROGRESS NOTES
"Chief Complaint   Patient presents with     Ear Problem     Follow up acute fungal OE       Patient returns to ENT for repeat ear debridement. He was last seen on 6/8/2023 and was overall doing well.  His ear had significant improvement upon exam.   recommended to continue with clotrimazole and return to ENT today for possible application miconazole powder.      Today, Lukas has been doing well since his last visit.   Hearing has improved to his normal range.   No recent drainage.   He reports some debris in canal.   Hx of TM perforation but did heal.   Denies COM or otologic surgeries.   Denies fluctuating hearing loss or tinnitus.   Denies vertigo or facial paraesthesia.      History of otitis media:   4/24/23 treated for otitis media with spontaneous rupture of tympanic membrane with Augmentin and ofloxacin otic solution.   4/15/23 treated for otitis media with spontaneous rupture of tympanic membrane with amoxicillin.   3/29/23 treated for right otitis externa with Ciprodex otic suspension       No past medical history on file.   No Known Allergies  Current Outpatient Medications   Medication     clotrimazole (LOTRIMIN) 1 % external solution     loratadine (CLARITIN) 10 MG tablet     No current facility-administered medications for this visit.     ROS- SEE HPI  /60 (BP Location: Right arm, Cuff Size: Adult Large)   Pulse 72   Temp 97  F (36.1  C) (Tympanic)   Ht 1.702 m (5' 7\")   Wt 90.3 kg (199 lb)   SpO2 97%   BMI 31.17 kg/m      General - The patient is well nourished and well developed, and appears to have good nutritional status.  Alert and oriented to person and place, answers questions and cooperates with examination appropriately.   Head and Face - Normocephalic and atraumatic, with no gross asymmetry noted.  The facial nerve is intact, with strong symmetric movements.  Voice and Breathing - The patient was breathing comfortably without the use of accessory muscles. There was no wheezing, " stridor, or stertor.  The patients voice was clear and strong, and had appropriate pitch and quality.  Ears - ears examined with autoscope and under otologic microscopy.  Left EAC is clear.  Left tympanic membrane is intact without effusion or retraction.    Right EAC has improved greatly since his last visit.  There scattered dried debris which was removed with #5, #3 Wheatley.  Canal appears without spur angio.  Tympanic membrane appears intact without effusion or retraction.  Miconazole applied throughout right EAC       ASSESSMENT/ PLAN     ICD-10-CM    1. Acute fungal otitis externa  B36.9     H62.40           Right ear appears greatly improved since last visit.  No spring injury was visualized upon exam.  Miconazole powder was applied throughout.    Hold clotrimazole solution.  Return in 4 to 6 weeks for repeat exam.  Continue with water precautions.  Return to ENT sooner if there is any worsening symptoms.          Beba Dacosta PA-C  ENT  [facility], [place]

## 2023-06-20 NOTE — PATIENT INSTRUCTIONS
Hold drops  Ear appears greatly improved.   No fungal changes on exam today.   Powder applied  Water precautions for the next 2 weeks    Follow up in 4-6 weeks, sooner if there are any concerns.       Thank you for allowing Beba Dacosta PA-C and our ENT team to participate in your care.  If your medications are too expensive, please give the nurse a call.  We can possibly change this medication.  If you have a scheduling or an appointment question please contact our Health Unit Coordinator at [phone], Ext. [phone].    ALL nursing questions or concerns can be directed to your ENT nurse at: [phone] [place]

## 2023-06-23 LAB — BACTERIA SPEC CULT: ABNORMAL

## 2023-07-19 ENCOUNTER — OFFICE VISIT (OUTPATIENT)
Dept: OTOLARYNGOLOGY | Facility: OTHER | Age: 38
End: 2023-07-19
Attending: PHYSICIAN ASSISTANT
Payer: COMMERCIAL

## 2023-07-19 VITALS
HEART RATE: 69 BPM | TEMPERATURE: 96.9 F | WEIGHT: 205 LBS | OXYGEN SATURATION: 96 % | SYSTOLIC BLOOD PRESSURE: 100 MMHG | BODY MASS INDEX: 32.18 KG/M2 | DIASTOLIC BLOOD PRESSURE: 60 MMHG | HEIGHT: 67 IN

## 2023-07-19 DIAGNOSIS — H62.40 ACUTE FUNGAL OTITIS EXTERNA: Primary | ICD-10-CM

## 2023-07-19 DIAGNOSIS — B36.9 ACUTE FUNGAL OTITIS EXTERNA: Primary | ICD-10-CM

## 2023-07-19 PROCEDURE — 92504 EAR MICROSCOPY EXAMINATION: CPT | Performed by: PHYSICIAN ASSISTANT

## 2023-07-19 PROCEDURE — 99212 OFFICE O/P EST SF 10 MIN: CPT | Mod: 25 | Performed by: PHYSICIAN ASSISTANT

## 2023-07-19 ASSESSMENT — PAIN SCALES - GENERAL: PAINLEVEL: NO PAIN (0)

## 2023-07-19 NOTE — LETTER
"    7/19/2023         RE: Lukas Uribe  [id number] [address]  [place] [address]        Dear Colleague,    Thank you for referring your patient, Lukas Uribe, [facility] [place]. Please see a copy of my visit note below.    Chief Complaint   Patient presents with     Ear Problem     Follow up acute fungal OE        Patient returns to ENT for repeat ear debridement.         Today, Lukas has been doing well since his last visit.   Hearing has improved to his normal range.   No recent drainage.   He reports some debris in canal.   Hx of TM perforation but did heal.   Denies COM or otologic surgeries.   Denies fluctuating hearing loss or tinnitus.   Denies vertigo or facial paraesthesia.      History of otitis media:   4/24/23 treated for otitis media with spontaneous rupture of tympanic membrane with Augmentin and ofloxacin otic solution.   4/15/23 treated for otitis media with spontaneous rupture of tympanic membrane with amoxicillin.   3/29/23 treated for right otitis externa with Ciprodex otic suspension  No past medical history on file.   No Known Allergies  Current Outpatient Medications   Medication     clotrimazole (LOTRIMIN) 1 % external solution     loratadine (CLARITIN) 10 MG tablet     No current facility-administered medications for this visit.     ROS- SEE HPI  /60 (BP Location: Right arm, Patient Position: Chair, Cuff Size: Adult Regular)   Pulse 69   Temp 96.9  F (36.1  C) (Tympanic)   Ht 1.702 m (5' 7\")   Wt 93 kg (205 lb)   SpO2 96%   BMI 32.11 kg/m        General - The patient is well nourished and well developed, and appears to have good nutritional status.  Alert and oriented to person and place, answers questions and cooperates with examination appropriately.   Head and Face - Normocephalic and atraumatic, with no gross asymmetry noted.  The facial nerve is intact, with strong symmetric movements.  Voice and Breathing - The patient was breathing comfortably " without the use of accessory muscles. There was no wheezing, stridor, or stertor.  The patients voice was clear and strong, and had appropriate pitch and quality.  Ears - ears examined with autoscope and under otologic microscopy.  Left EAC is clear.  Left tympanic membrane is intact without effusion or retraction.    Right EAC has improved greatly,  removed with #5, #3 Wheatley.  Canal appears without sporangia.   Tympanic membrane appears intact without effusion or retraction.          ASSESSMENT/ PLAN     ICD-10-CM    1. Resolved Acute fungal otitis externa  B36.9     H62.40           Ear appears great, No sporangia.   Follow up PRN    Return to ENT sooner if there is any worsening symptoms.           Beba Dacosta PA-C  ENT  [facility], [place]          Again, thank you for allowing me to participate in the care of your patient.        Sincerely,        Beba Dacosta PA-C

## 2023-07-19 NOTE — PROGRESS NOTES
"Chief Complaint   Patient presents with     Ear Problem     Follow up acute fungal OE        Patient returns to ENT for repeat ear debridement.         Today, Lukas has been doing well since his last visit.   Hearing has improved to his normal range.   No recent drainage.   He reports some debris in canal.   Hx of TM perforation but did heal.   Denies COM or otologic surgeries.   Denies fluctuating hearing loss or tinnitus.   Denies vertigo or facial paraesthesia.      History of otitis media:   4/24/23 treated for otitis media with spontaneous rupture of tympanic membrane with Augmentin and ofloxacin otic solution.   4/15/23 treated for otitis media with spontaneous rupture of tympanic membrane with amoxicillin.   3/29/23 treated for right otitis externa with Ciprodex otic suspension  No past medical history on file.   No Known Allergies  Current Outpatient Medications   Medication     clotrimazole (LOTRIMIN) 1 % external solution     loratadine (CLARITIN) 10 MG tablet     No current facility-administered medications for this visit.     ROS- SEE HPI  /60 (BP Location: Right arm, Patient Position: Chair, Cuff Size: Adult Regular)   Pulse 69   Temp 96.9  F (36.1  C) (Tympanic)   Ht 1.702 m (5' 7\")   Wt 93 kg (205 lb)   SpO2 96%   BMI 32.11 kg/m        General - The patient is well nourished and well developed, and appears to have good nutritional status.  Alert and oriented to person and place, answers questions and cooperates with examination appropriately.   Head and Face - Normocephalic and atraumatic, with no gross asymmetry noted.  The facial nerve is intact, with strong symmetric movements.  Voice and Breathing - The patient was breathing comfortably without the use of accessory muscles. There was no wheezing, stridor, or stertor.  The patients voice was clear and strong, and had appropriate pitch and quality.  Ears - ears examined with autoscope and under otologic microscopy.  Left EAC is clear. "  Left tympanic membrane is intact without effusion or retraction.    Right EAC has improved greatly,  removed with #5, #3 Wheatley.  Canal appears without sporangia.   Tympanic membrane appears intact without effusion or retraction.          ASSESSMENT/ PLAN     ICD-10-CM    1. Resolved Acute fungal otitis externa  B36.9     H62.40           Ear appears great, No sporangia.   Follow up PRN    Return to ENT sooner if there is any worsening symptoms.           Beba Dacosta PA-C  ENT  [facility], [place]

## 2024-07-13 ENCOUNTER — HEALTH MAINTENANCE LETTER (OUTPATIENT)
Age: 39
End: 2024-07-13

## 2024-08-25 ENCOUNTER — OFFICE VISIT (OUTPATIENT)
Dept: FAMILY MEDICINE | Facility: OTHER | Age: 39
End: 2024-08-25
Attending: REGISTERED NURSE
Payer: COMMERCIAL

## 2024-08-25 VITALS
RESPIRATION RATE: 18 BRPM | WEIGHT: 210.1 LBS | TEMPERATURE: 98.2 F | SYSTOLIC BLOOD PRESSURE: 122 MMHG | HEIGHT: 66 IN | BODY MASS INDEX: 33.77 KG/M2 | OXYGEN SATURATION: 97 % | DIASTOLIC BLOOD PRESSURE: 70 MMHG | HEART RATE: 71 BPM

## 2024-08-25 DIAGNOSIS — L73.9 FOLLICULITIS: Primary | ICD-10-CM

## 2024-08-25 DIAGNOSIS — L02.92 FURUNCLE OF SKIN OR SUBCUTANEOUS TISSUE: ICD-10-CM

## 2024-08-25 PROCEDURE — 87205 SMEAR GRAM STAIN: CPT | Mod: ZL | Performed by: REGISTERED NURSE

## 2024-08-25 PROCEDURE — 250N000009 HC RX 250: Performed by: REGISTERED NURSE

## 2024-08-25 PROCEDURE — 10060 I&D ABSCESS SIMPLE/SINGLE: CPT | Performed by: REGISTERED NURSE

## 2024-08-25 RX ORDER — SULFAMETHOXAZOLE/TRIMETHOPRIM 800-160 MG
1 TABLET ORAL 2 TIMES DAILY
Qty: 14 TABLET | Refills: 0 | Status: SHIPPED | OUTPATIENT
Start: 2024-08-25 | End: 2024-09-01

## 2024-08-25 RX ORDER — LIDOCAINE HYDROCHLORIDE AND EPINEPHRINE 10; 10 MG/ML; UG/ML
5 INJECTION, SOLUTION INFILTRATION; PERINEURAL ONCE
Status: COMPLETED | OUTPATIENT
Start: 2024-08-25 | End: 2024-08-25

## 2024-08-25 RX ADMIN — LIDOCAINE HYDROCHLORIDE,EPINEPHRINE BITARTRATE 5 ML: 10; .01 INJECTION, SOLUTION INFILTRATION; PERINEURAL at 13:31

## 2024-08-25 ASSESSMENT — PAIN SCALES - GENERAL: PAINLEVEL: SEVERE PAIN (7)

## 2024-08-25 ASSESSMENT — PATIENT HEALTH QUESTIONNAIRE - PHQ9: SUM OF ALL RESPONSES TO PHQ QUESTIONS 1-9: 0

## 2024-08-25 NOTE — PROGRESS NOTES
Lukas Uribe  1985    ASSESSMENT/PLAN:   1. Folliculitis  2. Furuncle of skin or subcutaneous tissue    - sulfamethoxazole-trimethoprim (BACTRIM DS) 800-160 MG tablet; Take 1 tablet by mouth 2 times daily for 7 days.  Dispense: 14 tablet; Refill: 0  - Wound Aerobic Bacterial Culture Routine With Gram Stain  - lidocaine 1% with EPINEPHrine 1:100,000 injection 5 mL    Verbal consent obtained.  Area cleansed with ChloraPrep.  5 mL lidocaine with epinephrine injected, then 10 blade scalpel used to make incision.  Moderate amount of purulent drainage expressed.  Culture obtained.  Area irrigated with 10 cc normal saline and packed with iodoform.  Wife present at bedside, feels comfortable packing dressing.  Instructed to pack for the next 3 days then can stop.  Keep covered with Band-Aid until fully healed.  Erythema, should subside with initiation of antibiotic therapy.  Patient has been instructed that if erythema worsens or he develops purulent discharge she should present for further follow-up.  Will follow culture and change antibiotic therapy if needed.      Patient agrees with plan of care and verbalizes understating. AVS printed. Patient education provided verbally and written instructions provided as requested.     SUBJECTIVE:   CHIEF COMPLAINT/ REASON FOR VISIT  Patient presents with:  Derm Problem: X1 week inner right knee, hard lump, red, swollen     HISTORY OF PRESENT ILLNESS  Lukas Uribe is a pleasant 39 year old male presents to [facility] clinic today for evaluation of 1 week history of a painful lump to the medial right knee.  Area is erythematous, tender and swollen.  He did try to pop this lesion and express fluid on his own, he was unsuccessful.  Erythema is extending. Denies fevers.       I have reviewed the nursing notes.  I have reviewed allergies, medication list, problem list, and past medical history.    REVIEW OF SYSTEMS  See HPI    VITAL SIGNS  Vitals:    08/25/24 1209   BP: 122/70  "  Pulse: 71   Resp: 18   Temp: 98.2  F (36.8  C)   TempSrc: Tympanic   SpO2: 97%   Weight: 95.3 kg (210 lb 1.6 oz)   Height: 1.676 m (5' 6\")      Body mass index is 33.91 kg/m .    OBJECTIVE:   PHYSICAL EXAM  Physical Exam  Constitutional:       Appearance: He is not ill-appearing.   Skin:     Findings: Erythema and lesion present.      Comments: See photo documentation below.  Lesion with fluctuance, tender upon palpation   Neurological:      Mental Status: He is alert.          DIAGNOSTICS  Results for orders placed or performed in visit on 08/25/24   Wound Aerobic Bacterial Culture Routine With Gram Stain     Status: None (Preliminary result)    Specimen: Leg, Right; Wound   Result Value Ref Range    Gram Stain Result 1+ PMNs/low power field     Gram Stain Result 1+ Squamous epithelial cells/low power field     Gram Stain Result No organisms seen         T[facility]ASURE [facility]  "

## 2024-08-25 NOTE — NURSING NOTE
"Chief Complaint   Patient presents with    Derm Problem     X1 week inner right knee, hard lump, red, swollen     Patient here with wife for a hard, red, painful lump on inner right knee x1 week. Patietn tried to drain this at home. Possible infection.     Initial /70   Pulse 71   Temp 98.2  F (36.8  C) (Tympanic)   Resp 18   Ht 1.676 m (5' 6\")   Wt 95.3 kg (210 lb 1.6 oz)   SpO2 97%   BMI 33.91 kg/m   Estimated body mass index is 33.91 kg/m  as calculated from the following:    Height as of this encounter: 1.676 m (5' 6\").    Weight as of this encounter: 95.3 kg (210 lb 1.6 oz).  Medication Review: complete    The next two questions are to help us understand your food security.  If you are feeling you need any assistance in this area, we have resources available to support you today.          8/25/2024   SDOH- Food Insecurity   Within the past 12 months, did you worry that your food would run out before you got money to buy more? N   Within the past 12 months, did the food you bought just not last and you didn t have money to get more? N            Health Care Directive:  Patient does not have a Health Care Directive or Living Will: Discussed advance care planning with patient; however, patient declined at this time.    Tata Evans LPN      " .

## 2024-08-25 NOTE — PATIENT INSTRUCTIONS
Your wife can continue with packing one a day for the next 3 days as demonstrated in clinic.   Monitor the redness, drainage. Follow up if redness persists despite being on antibiotics.

## 2024-08-27 ENCOUNTER — TELEPHONE (OUTPATIENT)
Dept: FAMILY MEDICINE | Facility: OTHER | Age: 39
End: 2024-08-27
Payer: COMMERCIAL

## 2024-08-27 NOTE — TELEPHONE ENCOUNTER
Patient was informed of results at 1:45pm by a  nurse. Closing encounter.     Tata Evans LPN on 8/27/2024 at 2:04 PM

## 2024-08-27 NOTE — TELEPHONE ENCOUNTER
[facility].    Reason for call: Request for results.    Name of test or procedure: test results from pus on leg    Date of test or procedure: 08/25/2024    Location of test or procedure: [facility]    Preferred method for responding to this message: Telephone Call    Phone number patient can be reached at: Cell number on file:    Telephone Information:   Mobile [phone]       If we can't reach you directly, may we leave a detailed response at the number you provided?Yes      What to use and how to clean the area that was cut into.    [facility]2024 at 1:57 PM

## 2024-08-28 LAB
BACTERIA WND CULT: ABNORMAL
BACTERIA WND CULT: ABNORMAL
GRAM STAIN RESULT: ABNORMAL

## 2025-07-19 ENCOUNTER — HEALTH MAINTENANCE LETTER (OUTPATIENT)
Age: 40
End: 2025-07-19

## (undated) RX ORDER — LIDOCAINE HYDROCHLORIDE AND EPINEPHRINE 10; 10 MG/ML; UG/ML
INJECTION, SOLUTION INFILTRATION; PERINEURAL
Status: DISPENSED
Start: 2024-08-25